# Patient Record
Sex: FEMALE | Race: WHITE | NOT HISPANIC OR LATINO | Employment: FULL TIME | URBAN - METROPOLITAN AREA
[De-identification: names, ages, dates, MRNs, and addresses within clinical notes are randomized per-mention and may not be internally consistent; named-entity substitution may affect disease eponyms.]

---

## 2019-11-08 ENCOUNTER — HOSPITAL ENCOUNTER (OUTPATIENT)
Dept: GENERAL RADIOLOGY | Facility: HOSPITAL | Age: 34
Discharge: HOME | End: 2019-11-08
Attending: OBSTETRICS & GYNECOLOGY

## 2020-08-17 ENCOUNTER — OFFICE VISIT CONVERTED (OUTPATIENT)
Dept: GASTROENTEROLOGY | Facility: CLINIC | Age: 35
End: 2020-08-17
Attending: NURSE PRACTITIONER

## 2020-08-17 ENCOUNTER — CONVERSION ENCOUNTER (OUTPATIENT)
Dept: GASTROENTEROLOGY | Facility: CLINIC | Age: 35
End: 2020-08-17

## 2020-08-19 ENCOUNTER — HOSPITAL ENCOUNTER (OUTPATIENT)
Dept: OTHER | Facility: HOSPITAL | Age: 35
Discharge: HOME | End: 2020-08-19
Attending: NURSE PRACTITIONER

## 2020-08-19 LAB
C DIFF TOX B STL QL CT TISS CULT: NEGATIVE
CONV 027 TOXIN: NEGATIVE

## 2020-08-21 LAB — BACTERIA SPEC AEROBE CULT: NORMAL

## 2020-09-02 ENCOUNTER — HOSPITAL ENCOUNTER (OUTPATIENT)
Dept: OTHER | Facility: HOSPITAL | Age: 35
Discharge: HOME | End: 2020-09-02
Attending: NURSE PRACTITIONER

## 2020-09-04 LAB — CONV CELIAC DISEASE AB-IGA: 66 MG/DL (ref 68–408)

## 2020-09-07 LAB — CELIAC DISEASE DUAL AG SCR: 28 UNITS (ref 0–19)

## 2020-09-08 LAB
CONV GLIADIN PEPTIDE AB IGA: 14 UNITS (ref 0–19)
TTG IGA SER-ACNC: <2 U/ML (ref 0–3)

## 2020-09-09 LAB
GLIADIN PEPTIDE IGG SER-ACNC: 4 UNITS (ref 0–19)
TTG IGG SER-ACNC: 8 U/ML (ref 0–5)

## 2020-10-17 ENCOUNTER — HOSPITAL ENCOUNTER (OUTPATIENT)
Dept: PREADMISSION TESTING | Facility: HOSPITAL | Age: 35
Discharge: HOME | End: 2020-10-17
Attending: INTERNAL MEDICINE

## 2020-10-19 LAB — SARS-COV-2 RNA SPEC QL NAA+PROBE: NOT DETECTED

## 2020-10-22 ENCOUNTER — HOSPITAL ENCOUNTER (OUTPATIENT)
Dept: GASTROENTEROLOGY | Facility: HOSPITAL | Age: 35
Setting detail: HOSPITAL OUTPATIENT SURGERY
Discharge: HOME | End: 2020-10-22
Attending: INTERNAL MEDICINE

## 2020-10-22 LAB — HCG UR QL: NEGATIVE

## 2021-05-10 NOTE — H&P
History and Physical      Patient Name: Najma Mike   Patient ID: [id number]   Sex: Female   YOB: 1985    Primary Care Provider: Justa CHUN   Referring Provider: Justa CHUN    Visit Date: August 17, 2020    Provider: HAI Cantrell   Location: [facility]   Location Address: [address] [place] [address], [address]  [place], [place]  [id number]   Location Phone: [phone]          Chief Complaint  · Diarrhea      History Of Present Illness  The patient is a 35 year old /White female who presents on referral from Justa CHUN for a gastroenterology evaluation.      She presents for evaluation of diarrhea.      Reports that symptoms have been present for 3 weeks.  Reports having 3-5 bowel movements per day.  Describes stool to be a #7 on the [facility] stool chart.      When diarrhea  started, she experienced abdominal cramping that is now much improved.      She is 3 months postpartum and currently breastfeeding.  Denies recent antibiotic use and international travel.      She reports that diarrhea is improved with Imodium. Admits taking probiotics for the past one year.            Past Medical History  ***No Significant Medical History         Past Surgical History  Appendectomy; Jaw Surgery         Medication List  Zoloft oral         Allergy List  NO KNOWN DRUG ALLERGIES       Allergies Reconciled  Family Medical History  *No Known Family History         Social History  Alcohol (Light); Tobacco (Never)         Review of Systems  · Constitutional  o Denies  o : chills, fever  · Eyes  o Denies  o : blurred vision, changes in vision  · Cardiovascular  o Denies  o : chest pain, irregular heart beats  · Respiratory  o Denies  o : shortness of breath, cough  · Gastrointestinal  o Admits  o : See HPI  · Genitourinary  o Denies  o : dysuria, blood in urine  · Integument  o Denies  o : rash, new skin lesions  · Neurologic  o Denies  o : tingling or  "numbness, seizures  · Musculoskeletal  o Denies  o : joint pain, joint swelling  · Endocrine  o Denies  o : weight gain, weight loss  · Psychiatric  o Denies  o : anxiety, depression      Vitals  Date Time BP Position Site L\R Cuff Size HR RR TEMP (F) WT  HT  BMI kg/m2 BSA m2 O2 Sat        08/17/2020 03:31 /76 Sitting      98.2 173lbs 0oz 5'  5\" 28.79 1.9           Physical Examination  · Constitutional  o Appearance  o : well developed, well-nourished, in no acute distress  · Eyes  o Vision  o :   § Visual Fields  § : eyes move symmetrical in all directions  o Sclerae  o : anicteric  o Pupils and Irises  o : pupils equal and symmetrical  · Neck  o Inspection/Palpation  o : supple  · Respiratory  o Respiratory Effort  o : breathing unlabored  o Inspection of Chest  o : normal appearance, no retractions  o Auscultation of Lungs  o : clear to auscultation bilaterally  · Cardiovascular  o Heart  o :   § Auscultation of Heart  § : no murmurs, gallops or rubs  · Gastrointestinal  o Abdominal Examination  o : soft, nontender to palpation, with normal active bowel sounds, no appreciable hepatosplenomegaly  o Digital Rectal Exam  o : deferred  · Lymphatic  o Neck  o : no palpable lymphadenopathy  · Skin and Subcutaneous Tissue  o General Inspection  o : without focal lesions; turgor is normal  · Psychiatric  o General  o : Alert and oriented x3  o Mood and Affect  o : Mood and affect are appropriate to circumstances  · Extremities  o Extremities  o : No edema, no cyanosis          Assessment  · Diarrhea     787.91/R19.7      Plan  · Orders  o C difficile Toxigenic Assay (PCR) [facility] ([id number]) - - 08/17/2020  o Stool culture and sensitivity (39897) - - 08/17/2020  o Fecal Occult Blood Diagnostic (Send to Lab) [facility] (74334) - - 08/17/2020  o Giardia and Cryptosporidium Enzyme Immunoassay [facility] (02575, 61203) - - 08/17/2020  o Lactoferrin (Fecal) Qualitative (05172) - - 08/17/2020  · Medications  o Medications have been " Reconciled  · Instructions  o Information given on current diagnoses. If stool studies negative will need colonoscopy.  · Disposition  o Follow up 2 months            Electronically Signed by: HAI Cantrell -Author on August 17, 2020 04:46:45 PM

## 2021-05-15 VITALS
SYSTOLIC BLOOD PRESSURE: 114 MMHG | TEMPERATURE: 98.2 F | DIASTOLIC BLOOD PRESSURE: 76 MMHG | BODY MASS INDEX: 28.82 KG/M2 | HEIGHT: 65 IN | WEIGHT: 173 LBS

## 2022-03-25 NOTE — PROGRESS NOTES
"Well Woman Visit    CC: Scheduled annual well gyn visit  Chief Complaint   Patient presents with   • Gynecologic Exam       Myriad intake in the past?: Yes    DATE PERFORMED:  Previously Qualified? NO Changes in Family Hx since? YES grandmother dx with breast cancer    Contraception: Condoms    HPI:   36 y.o. desires BTL    Menses:   q mo, lasts 5 days, not heavy.    Pain:  None    PCP: does not have PCP  History: PMHx, Meds, Allergies, PSHx, Social Hx, and POBHx all reviewed and updated.    Pt has no complaints today.    PHYSICAL EXAM:  /82   Pulse 77   Ht 165.1 cm (65\")   Wt 83.5 kg (184 lb)   LMP 2022   BMI 30.62 kg/m²  Not found.  General- NAD, alert and oriented, appropriate  Psych- Normal mood, good memory  Neck- No masses, no thyroid enlargement  CV- Regular rhythm, no murnurs  Resp- CTA to bases, no wheezes  Abdomen- Soft, non distended, non tender, no masses    Breast left-  Bilaterally symmetrical, no masses, non tender, no nipple discharge  Breast right- Bilaterally symmetrical, no masses, non tender, no nipple discharge    External genitalia- Normal female, no lesions  Urethra/meatus- Normal, no masses, non tender  Bladder- Normal, no masses, non tender  Vagina- Normal, no atrophy, no lesions, no discharge.    Cvx- Normal, no lesions, no discharge, No cervical motion tenderness  Uterus- Normal size, shape & consistency.  Non tender, mobile, & no prolapse  Adnexa- No mass, non tender  Anus/Rectum/Perineum- Not performed    Lymphatic- No palpable neck, axillary, or groin nodes  Ext- No edema, no cyanosis    Skin- No lesions, no rashes, no acanthosis nigricans      ASSESSMENT and PLAN:    Diagnoses and all orders for this visit:    1. Well woman exam with routine gynecological exam (Primary)  -     IgP, Aptima HPV    2. Birth control counseling      Discussed types of BTL.  Placed orders for bilat salpingectomy, possible filshie, preop week of OR. Abstinence 2weeks before " OR.      Preventative:  • BREAST HEALTH- Monthly self breast exam importance and how to reviewed. MMG and/or MRI (prn) reviewed per society guidelines and her individual history. Screen: Not medically needed  • CERVICAL CANCER Screening- Reviewed current ASCCP guidelines for screening w and wo cotest HPV, age specific.  Screen: Updated today  • COLON CANCER Screening- Reviewed current medical society guidelines and options.  Screen:  Not medically needed  • Importance of WEIGHT MANAGEMENT, nutrition, and exercise reviewed  • BONE HEALTH- Reviewed current medical society guidelines and options for testing, calcium and vit D intake.  Weight bearing exercise.  DEXA: Not medically needed  • VACCINATIONS Recommended: COVID and booster PRN, Flu annually, Tdap [id number].  Importance discussed, risk being unvaccinated reviewed.  Questions answered  • Smoking status- NON SMOKER.     MYRIAD: Does not qualify.    She understands the importance of having any ordered tests to be performed in a timely fashion.  The risks of not performing them include, but are not limited to, advanced cancer stages, bone loss from osteoporosis and/or subsequent increase in morbidity and/or mortality.  She is encouraged to review her results online and/or contact or office if she has questions.     Follow Up:  Return for Preop OV.            Asuncion Murphy,   03/28/2022    [facility]  [facility] [place] [address]  Dept: [phone]  Dept Fax: [phone]  Loc: [phone]  Loc Fax: [phone]

## 2022-03-28 ENCOUNTER — PREP FOR SURGERY (OUTPATIENT)
Dept: OTHER | Facility: HOSPITAL | Age: 37
End: 2022-03-28

## 2022-03-28 ENCOUNTER — OFFICE VISIT (OUTPATIENT)
Dept: OBSTETRICS AND GYNECOLOGY | Facility: CLINIC | Age: 37
End: 2022-03-28

## 2022-03-28 VITALS
HEART RATE: 77 BPM | WEIGHT: 184 LBS | SYSTOLIC BLOOD PRESSURE: 143 MMHG | HEIGHT: 65 IN | BODY MASS INDEX: 30.66 KG/M2 | DIASTOLIC BLOOD PRESSURE: 82 MMHG

## 2022-03-28 DIAGNOSIS — Z01.419 WELL WOMAN EXAM WITH ROUTINE GYNECOLOGICAL EXAM: Primary | ICD-10-CM

## 2022-03-28 DIAGNOSIS — Z30.09 UNWANTED FERTILITY: Primary | ICD-10-CM

## 2022-03-28 DIAGNOSIS — Z30.09 BIRTH CONTROL COUNSELING: ICD-10-CM

## 2022-03-28 DIAGNOSIS — Z30.2 ENCOUNTER FOR STERILIZATION: Primary | ICD-10-CM

## 2022-03-28 PROCEDURE — 99395 PREV VISIT EST AGE 18-39: CPT | Performed by: OBSTETRICS & GYNECOLOGY

## 2022-03-28 PROCEDURE — G0123 SCREEN CERV/VAG THIN LAYER: HCPCS | Performed by: OBSTETRICS & GYNECOLOGY

## 2022-03-28 PROCEDURE — 87624 HPV HI-RISK TYP POOLED RSLT: CPT | Performed by: OBSTETRICS & GYNECOLOGY

## 2022-03-28 RX ORDER — CEFAZOLIN SODIUM 2 G/100ML
2 INJECTION, SOLUTION INTRAVENOUS ONCE
Status: CANCELLED | OUTPATIENT
Start: 2022-03-28 | End: 2022-03-28

## 2022-03-28 RX ORDER — SODIUM CHLORIDE 0.9 % (FLUSH) 0.9 %
10 SYRINGE (ML) INJECTION AS NEEDED
Status: CANCELLED | OUTPATIENT
Start: 2022-03-28

## 2022-03-28 RX ORDER — ONDANSETRON 2 MG/ML
4 INJECTION INTRAMUSCULAR; INTRAVENOUS EVERY 6 HOURS PRN
Status: CANCELLED | OUTPATIENT
Start: 2022-03-28

## 2022-03-28 RX ORDER — SODIUM CHLORIDE, SODIUM LACTATE, POTASSIUM CHLORIDE, CALCIUM CHLORIDE 600; 310; 30; 20 MG/100ML; MG/100ML; MG/100ML; MG/100ML
150 INJECTION, SOLUTION INTRAVENOUS CONTINUOUS
Status: CANCELLED | OUTPATIENT
Start: 2022-03-28

## 2022-03-28 RX ORDER — SODIUM CHLORIDE 0.9 % (FLUSH) 0.9 %
3 SYRINGE (ML) INJECTION EVERY 12 HOURS SCHEDULED
Status: CANCELLED | OUTPATIENT
Start: 2022-03-28

## 2022-04-01 LAB
CYTOLOGIST CVX/VAG CYTO: NORMAL
CYTOLOGY CVX/VAG DOC CYTO: NORMAL
CYTOLOGY CVX/VAG DOC THIN PREP: NORMAL
DX ICD CODE: NORMAL
HIV 1 & 2 AB SER-IMP: NORMAL
HPV I/H RISK 4 DNA CVX QL PROBE+SIG AMP: NEGATIVE
OTHER STN SPEC: NORMAL
STAT OF ADQ CVX/VAG CYTO-IMP: NORMAL

## 2022-04-21 RX ORDER — DIPHENOXYLATE HYDROCHLORIDE AND ATROPINE SULFATE 2.5; .025 MG/1; MG/1
1 TABLET ORAL
COMMUNITY

## 2022-04-22 ENCOUNTER — LAB (OUTPATIENT)
Dept: LAB | Facility: HOSPITAL | Age: 37
End: 2022-04-22

## 2022-04-22 DIAGNOSIS — Z30.2 ENCOUNTER FOR STERILIZATION: ICD-10-CM

## 2022-04-22 LAB — SARS-COV-2 RNA PNL SPEC NAA+PROBE: NOT DETECTED

## 2022-04-22 PROCEDURE — U0004 COV-19 TEST NON-CDC HGH THRU: HCPCS

## 2022-04-22 PROCEDURE — C9803 HOPD COVID-19 SPEC COLLECT: HCPCS

## 2022-04-25 ENCOUNTER — OFFICE VISIT (OUTPATIENT)
Dept: OBSTETRICS AND GYNECOLOGY | Facility: CLINIC | Age: 37
End: 2022-04-25

## 2022-04-25 VITALS
BODY MASS INDEX: 30.45 KG/M2 | HEART RATE: 81 BPM | OXYGEN SATURATION: 98 % | WEIGHT: 183 LBS | DIASTOLIC BLOOD PRESSURE: 70 MMHG | SYSTOLIC BLOOD PRESSURE: 116 MMHG

## 2022-04-25 DIAGNOSIS — Z30.09 UNWANTED FERTILITY: Primary | ICD-10-CM

## 2022-04-25 DIAGNOSIS — Z01.818 PREOPERATIVE EXAMINATION: ICD-10-CM

## 2022-04-25 PROCEDURE — 99213 OFFICE O/P EST LOW 20 MIN: CPT | Performed by: OBSTETRICS & GYNECOLOGY

## 2022-04-25 PROCEDURE — S0260 H&P FOR SURGERY: HCPCS | Performed by: OBSTETRICS & GYNECOLOGY

## 2022-04-25 NOTE — PROGRESS NOTES
GYN HISTORY & PHYSICAL      Patient Name: Najma Mike  : 1985  MRN: [id number]    Subjective     Chief Complaint: Scheduled Surgery    HPI:  37 y.o.  Contraception:  Condoms    Desires risk reducing salpingectomy.    ROS: All negative except listed in HPI    Personal History     PMHx:    Past Medical History:   Diagnosis Date   • Abnormal Pap smear of cervix     Normal paps since   • Anxiety    • Depression    • History of stomach ulcers     NO CURRENT PROBLEMS   • Migraine WITH aura        OBHx:   OB History    Para Term  AB Living   2 2 2     2   SAB IAB Ectopic Molar Multiple Live Births             2      # Outcome Date GA Lbr Adama/2nd Weight Sex Delivery Anes PTL Lv   2 Term 20 39w1d   M Vag-Spont   THOMAS   1 Term     F Vaginal unsp   THOMAS        Home Medications:  Cetirizine HCl and multivitamin    Allergies:  No Known Allergies    PSHx:   Past Surgical History:   Procedure Laterality Date   • APPENDECTOMY      Open, ruptured   • COLONOSCOPY  10/22/2020   • ENDOSCOPY  10/22/2020   • MANDIBLE SURGERY     • WISDOM TOOTH EXTRACTION         Social History:    reports that she has quit smoking. She has never used smokeless tobacco. She reports current alcohol use. She reports that she does not use drugs.    Family History: Non contributory     Immunizations: Non contributory to this admission        Objective     Vitals:   Heart Rate:  [81] 81  BP: (116)/(70) 116/70    PHYSICAL EXAM:   General- NAD, alert and oriented, appropriate  Psych- Normal mood, good memory  CV- Regular rhythm, no murnurs  Resp- CTA to bases, no wheezes  Abdomen- NABS, soft, non distended, non tender, no masses  Pelvic- Normal external female genitalia  Bladder: Non distended, non tender, no prolapse  Vulva/Vagina: Without lesion and without discharge  Cvx: No cervical motion tenderness  Uterus: Normal size & contour, non tender  Adnexa: No mass, non tender  Lymphatic- No palpable groin  nodes  Rectal- Not examined.     Ext- No edema, bilaterally equal      Lab/Imaging/Other: Pap neg 3/2022      Assessment / Plan     Assessment:  Diagnoses and all orders for this visit:    1. Unwanted fertility (Primary)  Overview:  Added automatically from request for surgery [id number]      2. Preoperative examination      Plan:   • Laparoscopic bilat salpingectomy, possible filshie clips  • Counseling: Risks and benefits and alternatives of surgery were discussed with patient.  Risks are not limited to anesthesia, bleeding, blood transfusion, infection, damage to surrounding organs, wound separation, re-operation, thromboembolic disease, death.  • See separate written and signed consent for surgical specific risks and benefits.    I spent 20 minutes caring for Najma on this date of service. This time includes time spent by me in the following activities:preparing for the visit, reviewing tests, obtaining and/or reviewing a separately obtained history, performing a medically appropriate examination and/or evaluation , counseling and educating the patient/family/caregiver and documenting information in the medical record      Signature: Electronically signed by Asuncion Murphy DO, 04/25/22, 1:58 PM EDT.

## 2022-04-27 ENCOUNTER — ANESTHESIA EVENT (OUTPATIENT)
Dept: PERIOP | Facility: HOSPITAL | Age: 37
End: 2022-04-27

## 2022-04-27 ENCOUNTER — ANESTHESIA (OUTPATIENT)
Dept: PERIOP | Facility: HOSPITAL | Age: 37
End: 2022-04-27

## 2022-04-27 ENCOUNTER — HOSPITAL ENCOUNTER (OUTPATIENT)
Facility: HOSPITAL | Age: 37
Setting detail: HOSPITAL OUTPATIENT SURGERY
Discharge: HOME | End: 2022-04-27
Attending: OBSTETRICS & GYNECOLOGY | Admitting: OBSTETRICS & GYNECOLOGY

## 2022-04-27 VITALS
BODY MASS INDEX: 30.45 KG/M2 | DIASTOLIC BLOOD PRESSURE: 60 MMHG | WEIGHT: 182.76 LBS | HEART RATE: 68 BPM | RESPIRATION RATE: 16 BRPM | SYSTOLIC BLOOD PRESSURE: 112 MMHG | TEMPERATURE: 98 F | HEIGHT: 65 IN | OXYGEN SATURATION: 99 %

## 2022-04-27 DIAGNOSIS — Z30.09 UNWANTED FERTILITY: ICD-10-CM

## 2022-04-27 LAB
ABO GROUP BLD: NORMAL
ABO GROUP BLD: NORMAL
BASOPHILS # BLD AUTO: 0.02 10*3/MM3 (ref 0–0.2)
BASOPHILS NFR BLD AUTO: 0.3 % (ref 0–1.5)
BLD GP AB SCN SERPL QL: NEGATIVE
DEPRECATED RDW RBC AUTO: 40.4 FL (ref 37–54)
EOSINOPHIL # BLD AUTO: 0.07 10*3/MM3 (ref 0–0.4)
EOSINOPHIL NFR BLD AUTO: 1.2 % (ref 0.3–6.2)
ERYTHROCYTE [DISTWIDTH] IN BLOOD BY AUTOMATED COUNT: 12.7 % (ref 12.3–15.4)
HCG INTACT+B SERPL-ACNC: <0.5 MIU/ML
HCT VFR BLD AUTO: 39.4 % (ref 34–46.6)
HGB BLD-MCNC: 13.6 G/DL (ref 12–15.9)
IMM GRANULOCYTES # BLD AUTO: 0.03 10*3/MM3 (ref 0–0.05)
IMM GRANULOCYTES NFR BLD AUTO: 0.5 % (ref 0–0.5)
LYMPHOCYTES # BLD AUTO: 2.04 10*3/MM3 (ref 0.7–3.1)
LYMPHOCYTES NFR BLD AUTO: 33.6 % (ref 19.6–45.3)
MCH RBC QN AUTO: 30.2 PG (ref 26.6–33)
MCHC RBC AUTO-ENTMCNC: 34.5 G/DL (ref 31.5–35.7)
MCV RBC AUTO: 87.4 FL (ref 79–97)
MONOCYTES # BLD AUTO: 0.39 10*3/MM3 (ref 0.1–0.9)
MONOCYTES NFR BLD AUTO: 6.4 % (ref 5–12)
NEUTROPHILS NFR BLD AUTO: 3.52 10*3/MM3 (ref 1.7–7)
NEUTROPHILS NFR BLD AUTO: 58 % (ref 42.7–76)
NRBC BLD AUTO-RTO: 0 /100 WBC (ref 0–0.2)
PLATELET # BLD AUTO: 257 10*3/MM3 (ref 140–450)
PMV BLD AUTO: 9.8 FL (ref 6–12)
RBC # BLD AUTO: 4.51 10*6/MM3 (ref 3.77–5.28)
RH BLD: POSITIVE
RH BLD: POSITIVE
T&S EXPIRATION DATE: NORMAL
WBC NRBC COR # BLD: 6.07 10*3/MM3 (ref 3.4–10.8)

## 2022-04-27 PROCEDURE — 25010000002 DEXAMETHASONE PER 1 MG

## 2022-04-27 PROCEDURE — 86900 BLOOD TYPING SEROLOGIC ABO: CPT | Performed by: OBSTETRICS & GYNECOLOGY

## 2022-04-27 PROCEDURE — 58661 LAPAROSCOPY REMOVE ADNEXA: CPT | Performed by: OBSTETRICS & GYNECOLOGY

## 2022-04-27 PROCEDURE — 25010000002 FENTANYL CITRATE (PF) 50 MCG/ML SOLUTION

## 2022-04-27 PROCEDURE — 86901 BLOOD TYPING SEROLOGIC RH(D): CPT | Performed by: OBSTETRICS & GYNECOLOGY

## 2022-04-27 PROCEDURE — 85025 COMPLETE CBC W/AUTO DIFF WBC: CPT | Performed by: OBSTETRICS & GYNECOLOGY

## 2022-04-27 PROCEDURE — 0 HYDROMORPHONE 1 MG/ML SOLUTION

## 2022-04-27 PROCEDURE — 25010000002 HYDROMORPHONE 1 MG/ML SOLUTION

## 2022-04-27 PROCEDURE — 86901 BLOOD TYPING SEROLOGIC RH(D): CPT

## 2022-04-27 PROCEDURE — 25010000002 MIDAZOLAM PER 1 MG: Performed by: ANESTHESIOLOGY

## 2022-04-27 PROCEDURE — 86900 BLOOD TYPING SEROLOGIC ABO: CPT

## 2022-04-27 PROCEDURE — 88305 TISSUE EXAM BY PATHOLOGIST: CPT | Performed by: OBSTETRICS & GYNECOLOGY

## 2022-04-27 PROCEDURE — 25010000002 KETOROLAC TROMETHAMINE PER 15 MG

## 2022-04-27 PROCEDURE — 25010000002 PROPOFOL 10 MG/ML EMULSION

## 2022-04-27 PROCEDURE — 84702 CHORIONIC GONADOTROPIN TEST: CPT | Performed by: OBSTETRICS & GYNECOLOGY

## 2022-04-27 PROCEDURE — 25010000002 CEFAZOLIN IN DEXTROSE 2-4 GM/100ML-% SOLUTION: Performed by: OBSTETRICS & GYNECOLOGY

## 2022-04-27 PROCEDURE — 86850 RBC ANTIBODY SCREEN: CPT | Performed by: OBSTETRICS & GYNECOLOGY

## 2022-04-27 PROCEDURE — 25010000002 ONDANSETRON PER 1 MG

## 2022-04-27 RX ORDER — OXYCODONE HYDROCHLORIDE 5 MG/1
5-10 TABLET ORAL EVERY 4 HOURS PRN
Qty: 12 TABLET | Refills: 0 | Status: SHIPPED | OUTPATIENT
Start: 2022-04-27

## 2022-04-27 RX ORDER — SODIUM CHLORIDE 0.9 % (FLUSH) 0.9 %
10 SYRINGE (ML) INJECTION AS NEEDED
Status: DISCONTINUED | OUTPATIENT
Start: 2022-04-27 | End: 2022-04-27 | Stop reason: HOSPADM

## 2022-04-27 RX ORDER — ONDANSETRON 2 MG/ML
4 INJECTION INTRAMUSCULAR; INTRAVENOUS ONCE AS NEEDED
Status: DISCONTINUED | OUTPATIENT
Start: 2022-04-27 | End: 2022-04-27 | Stop reason: HOSPADM

## 2022-04-27 RX ORDER — ACETAMINOPHEN 500 MG
1000 TABLET ORAL ONCE
Status: COMPLETED | OUTPATIENT
Start: 2022-04-27 | End: 2022-04-27

## 2022-04-27 RX ORDER — DEXAMETHASONE SODIUM PHOSPHATE 4 MG/ML
INJECTION, SOLUTION INTRA-ARTICULAR; INTRALESIONAL; INTRAMUSCULAR; INTRAVENOUS; SOFT TISSUE AS NEEDED
Status: DISCONTINUED | OUTPATIENT
Start: 2022-04-27 | End: 2022-04-27 | Stop reason: SURG

## 2022-04-27 RX ORDER — PROPOFOL 10 MG/ML
VIAL (ML) INTRAVENOUS AS NEEDED
Status: DISCONTINUED | OUTPATIENT
Start: 2022-04-27 | End: 2022-04-27 | Stop reason: SURG

## 2022-04-27 RX ORDER — IBUPROFEN 600 MG/1
600 TABLET ORAL EVERY 6 HOURS PRN
Qty: 30 TABLET | Refills: 1 | Status: SHIPPED | OUTPATIENT
Start: 2022-04-27

## 2022-04-27 RX ORDER — FENTANYL CITRATE 50 UG/ML
INJECTION, SOLUTION INTRAMUSCULAR; INTRAVENOUS AS NEEDED
Status: DISCONTINUED | OUTPATIENT
Start: 2022-04-27 | End: 2022-04-27 | Stop reason: SURG

## 2022-04-27 RX ORDER — PROMETHAZINE HYDROCHLORIDE 12.5 MG/1
12.5 TABLET ORAL ONCE AS NEEDED
Status: DISCONTINUED | OUTPATIENT
Start: 2022-04-27 | End: 2022-04-27 | Stop reason: HOSPADM

## 2022-04-27 RX ORDER — IBUPROFEN 600 MG/1
600 TABLET ORAL EVERY 6 HOURS PRN
Status: DISCONTINUED | OUTPATIENT
Start: 2022-04-27 | End: 2022-04-27 | Stop reason: HOSPADM

## 2022-04-27 RX ORDER — SODIUM CHLORIDE, SODIUM LACTATE, POTASSIUM CHLORIDE, CALCIUM CHLORIDE 600; 310; 30; 20 MG/100ML; MG/100ML; MG/100ML; MG/100ML
9 INJECTION, SOLUTION INTRAVENOUS CONTINUOUS PRN
Status: DISCONTINUED | OUTPATIENT
Start: 2022-04-27 | End: 2022-04-27 | Stop reason: HOSPADM

## 2022-04-27 RX ORDER — ONDANSETRON 2 MG/ML
4 INJECTION INTRAMUSCULAR; INTRAVENOUS EVERY 6 HOURS PRN
Status: DISCONTINUED | OUTPATIENT
Start: 2022-04-27 | End: 2022-04-27 | Stop reason: HOSPADM

## 2022-04-27 RX ORDER — LIDOCAINE HYDROCHLORIDE 20 MG/ML
INJECTION, SOLUTION EPIDURAL; INFILTRATION; INTRACAUDAL; PERINEURAL AS NEEDED
Status: DISCONTINUED | OUTPATIENT
Start: 2022-04-27 | End: 2022-04-27 | Stop reason: SURG

## 2022-04-27 RX ORDER — PROMETHAZINE HYDROCHLORIDE 25 MG/1
25 SUPPOSITORY RECTAL ONCE AS NEEDED
Status: DISCONTINUED | OUTPATIENT
Start: 2022-04-27 | End: 2022-04-27 | Stop reason: HOSPADM

## 2022-04-27 RX ORDER — DEXMEDETOMIDINE HYDROCHLORIDE 100 UG/ML
INJECTION, SOLUTION INTRAVENOUS AS NEEDED
Status: DISCONTINUED | OUTPATIENT
Start: 2022-04-27 | End: 2022-04-27 | Stop reason: SURG

## 2022-04-27 RX ORDER — ACETAMINOPHEN 325 MG/1
650 TABLET ORAL EVERY 4 HOURS PRN
Qty: 30 TABLET | Refills: 1 | Status: SHIPPED | OUTPATIENT
Start: 2022-04-27

## 2022-04-27 RX ORDER — PHENYLEPHRINE HCL IN 0.9% NACL 1 MG/10 ML
SYRINGE (ML) INTRAVENOUS AS NEEDED
Status: DISCONTINUED | OUTPATIENT
Start: 2022-04-27 | End: 2022-04-27 | Stop reason: SURG

## 2022-04-27 RX ORDER — BUPIVACAINE HYDROCHLORIDE 2.5 MG/ML
INJECTION, SOLUTION EPIDURAL; INFILTRATION; INTRACAUDAL AS NEEDED
Status: DISCONTINUED | OUTPATIENT
Start: 2022-04-27 | End: 2022-04-27 | Stop reason: HOSPADM

## 2022-04-27 RX ORDER — MEPERIDINE HYDROCHLORIDE 25 MG/ML
12.5 INJECTION INTRAMUSCULAR; INTRAVENOUS; SUBCUTANEOUS
Status: DISCONTINUED | OUTPATIENT
Start: 2022-04-27 | End: 2022-04-27 | Stop reason: HOSPADM

## 2022-04-27 RX ORDER — ROCURONIUM BROMIDE 10 MG/ML
INJECTION, SOLUTION INTRAVENOUS AS NEEDED
Status: DISCONTINUED | OUTPATIENT
Start: 2022-04-27 | End: 2022-04-27 | Stop reason: SURG

## 2022-04-27 RX ORDER — ACETAMINOPHEN 325 MG/1
650 TABLET ORAL ONCE
Status: DISCONTINUED | OUTPATIENT
Start: 2022-04-27 | End: 2022-04-27 | Stop reason: HOSPADM

## 2022-04-27 RX ORDER — GLYCOPYRROLATE 0.2 MG/ML
INJECTION INTRAMUSCULAR; INTRAVENOUS AS NEEDED
Status: DISCONTINUED | OUTPATIENT
Start: 2022-04-27 | End: 2022-04-27 | Stop reason: SURG

## 2022-04-27 RX ORDER — SODIUM CHLORIDE, SODIUM LACTATE, POTASSIUM CHLORIDE, CALCIUM CHLORIDE 600; 310; 30; 20 MG/100ML; MG/100ML; MG/100ML; MG/100ML
150 INJECTION, SOLUTION INTRAVENOUS CONTINUOUS
Status: DISCONTINUED | OUTPATIENT
Start: 2022-04-27 | End: 2022-04-27 | Stop reason: HOSPADM

## 2022-04-27 RX ORDER — SODIUM CHLORIDE 0.9 % (FLUSH) 0.9 %
3 SYRINGE (ML) INJECTION EVERY 12 HOURS SCHEDULED
Status: DISCONTINUED | OUTPATIENT
Start: 2022-04-27 | End: 2022-04-27 | Stop reason: HOSPADM

## 2022-04-27 RX ORDER — PROMETHAZINE HYDROCHLORIDE 12.5 MG/1
25 TABLET ORAL ONCE AS NEEDED
Status: DISCONTINUED | OUTPATIENT
Start: 2022-04-27 | End: 2022-04-27 | Stop reason: HOSPADM

## 2022-04-27 RX ORDER — KETOROLAC TROMETHAMINE 30 MG/ML
INJECTION, SOLUTION INTRAMUSCULAR; INTRAVENOUS AS NEEDED
Status: DISCONTINUED | OUTPATIENT
Start: 2022-04-27 | End: 2022-04-27 | Stop reason: SURG

## 2022-04-27 RX ORDER — GLYCOPYRROLATE 0.2 MG/ML
0.2 INJECTION INTRAMUSCULAR; INTRAVENOUS
Status: COMPLETED | OUTPATIENT
Start: 2022-04-27 | End: 2022-04-27

## 2022-04-27 RX ORDER — OXYCODONE HYDROCHLORIDE 5 MG/1
5 TABLET ORAL
Status: DISCONTINUED | OUTPATIENT
Start: 2022-04-27 | End: 2022-04-27 | Stop reason: HOSPADM

## 2022-04-27 RX ORDER — CEFAZOLIN SODIUM 2 G/100ML
2 INJECTION, SOLUTION INTRAVENOUS ONCE
Status: COMPLETED | OUTPATIENT
Start: 2022-04-27 | End: 2022-04-27

## 2022-04-27 RX ORDER — MIDAZOLAM HYDROCHLORIDE 1 MG/ML
2 INJECTION INTRAMUSCULAR; INTRAVENOUS ONCE
Status: COMPLETED | OUTPATIENT
Start: 2022-04-27 | End: 2022-04-27

## 2022-04-27 RX ORDER — ONDANSETRON 2 MG/ML
INJECTION INTRAMUSCULAR; INTRAVENOUS AS NEEDED
Status: DISCONTINUED | OUTPATIENT
Start: 2022-04-27 | End: 2022-04-27 | Stop reason: SURG

## 2022-04-27 RX ADMIN — Medication 150 MCG: at 13:30

## 2022-04-27 RX ADMIN — HYDROMORPHONE HYDROCHLORIDE 0.5 MG: 1 INJECTION, SOLUTION INTRAMUSCULAR; INTRAVENOUS; SUBCUTANEOUS at 14:11

## 2022-04-27 RX ADMIN — GLYCOPYRROLATE 0.2 MG: 0.2 INJECTION INTRAMUSCULAR; INTRAVENOUS at 12:37

## 2022-04-27 RX ADMIN — SUGAMMADEX 200 MG: 100 INJECTION, SOLUTION INTRAVENOUS at 13:48

## 2022-04-27 RX ADMIN — KETOROLAC TROMETHAMINE 30 MG: 30 INJECTION, SOLUTION INTRAMUSCULAR; INTRAVENOUS at 13:42

## 2022-04-27 RX ADMIN — ROCURONIUM BROMIDE 50 MG: 10 INJECTION INTRAVENOUS at 13:01

## 2022-04-27 RX ADMIN — FENTANYL CITRATE 100 MCG: 50 INJECTION, SOLUTION INTRAMUSCULAR; INTRAVENOUS at 13:01

## 2022-04-27 RX ADMIN — DEXMEDETOMIDINE HYDROCHLORIDE 20 MCG: 100 INJECTION, SOLUTION, CONCENTRATE INTRAVENOUS at 13:17

## 2022-04-27 RX ADMIN — MIDAZOLAM HYDROCHLORIDE 2 MG: 1 INJECTION, SOLUTION INTRAMUSCULAR; INTRAVENOUS at 12:37

## 2022-04-27 RX ADMIN — PROPOFOL 200 MG: 10 INJECTION, EMULSION INTRAVENOUS at 13:01

## 2022-04-27 RX ADMIN — LIDOCAINE HYDROCHLORIDE 50 MG: 20 INJECTION, SOLUTION EPIDURAL; INFILTRATION; INTRACAUDAL; PERINEURAL at 13:01

## 2022-04-27 RX ADMIN — ACETAMINOPHEN 1000 MG: 500 TABLET ORAL at 10:30

## 2022-04-27 RX ADMIN — DEXAMETHASONE SODIUM PHOSPHATE 4 MG: 4 INJECTION, SOLUTION INTRA-ARTICULAR; INTRALESIONAL; INTRAMUSCULAR; INTRAVENOUS; SOFT TISSUE at 13:01

## 2022-04-27 RX ADMIN — SODIUM CHLORIDE, POTASSIUM CHLORIDE, SODIUM LACTATE AND CALCIUM CHLORIDE 9 ML/HR: 600; 310; 30; 20 INJECTION, SOLUTION INTRAVENOUS at 10:32

## 2022-04-27 RX ADMIN — HYDROMORPHONE HYDROCHLORIDE 0.5 MG: 1 INJECTION, SOLUTION INTRAMUSCULAR; INTRAVENOUS; SUBCUTANEOUS at 13:27

## 2022-04-27 RX ADMIN — CEFAZOLIN SODIUM 2 G: 2 INJECTION, SOLUTION INTRAVENOUS at 13:06

## 2022-04-27 RX ADMIN — ONDANSETRON 4 MG: 2 INJECTION INTRAMUSCULAR; INTRAVENOUS at 13:01

## 2022-04-27 RX ADMIN — HYDROMORPHONE HYDROCHLORIDE 0.5 MG: 1 INJECTION, SOLUTION INTRAMUSCULAR; INTRAVENOUS; SUBCUTANEOUS at 13:56

## 2022-04-27 RX ADMIN — GLYCOPYRROLATE 0.2 MG: 0.2 INJECTION INTRAMUSCULAR; INTRAVENOUS at 13:30

## 2022-04-27 NOTE — ANESTHESIA POSTPROCEDURE EVALUATION
Patient: Najma Mike    Procedure Summary     Date: 04/27/22 Room / Location: [facility] OR 01 / [facility] MAIN OR    Anesthesia Start: 1256 Anesthesia Stop: 1400    Procedure: SALPINGECTOMY LAPAROSCOPIC (Bilateral Abdomen) Diagnosis:       Unwanted fertility      (Unwanted fertility [Z30.09])    Surgeons: Asuncion Murphy DO Provider: Austin Viveros MD    Anesthesia Type: general ASA Status: 1          Anesthesia Type: general    Vitals  Vitals Value Taken Time   /64 04/27/22 1419   Temp 36.1 °C (97 °F) 04/27/22 1359   Pulse 73 04/27/22 1423   Resp 20 04/27/22 1419   SpO2 95 % 04/27/22 1422   Vitals shown include unvalidated device data.        Post Anesthesia Care and Evaluation    Patient location during evaluation: bedside  Patient participation: complete - patient participated  Level of consciousness: awake  Pain management: adequate  Airway patency: patent  Anesthetic complications: No anesthetic complications  PONV Status: none  Cardiovascular status: acceptable and stable  Respiratory status: acceptable  Hydration status: acceptable    Comments: An Anesthesiologist personally participated in the most demanding procedures (including induction and emergence if applicable) in the anesthesia plan, monitored the course of anesthesia administration at frequent intervals and remained physically present and available for immediate diagnosis and treatment of emergencies.

## 2022-04-27 NOTE — ANESTHESIA PREPROCEDURE EVALUATION
Anesthesia Evaluation     Patient summary reviewed and Nursing notes reviewed                Airway   Mallampati: I  TM distance: >3 FB  Neck ROM: full  No difficulty expected  Dental      Pulmonary - negative pulmonary ROS and normal exam    breath sounds clear to auscultation  Cardiovascular - negative cardio ROS and normal exam    Rhythm: regular  Rate: normal        Neuro/Psych  (+) headaches, psychiatric history,    GI/Hepatic/Renal/Endo    (+) obesity,       Musculoskeletal (-) negative ROS    Abdominal    Substance History - negative use     OB/GYN negative ob/gyn ROS         Other                        Anesthesia Plan    ASA 1     general     intravenous induction     Anesthetic plan, all risks, benefits, and alternatives have been provided, discussed and informed consent has been obtained with: patient.        CODE STATUS:

## 2022-04-30 LAB
CYTO UR: NORMAL
LAB AP CASE REPORT: NORMAL
LAB AP CLINICAL INFORMATION: NORMAL
LAB AP DIAGNOSIS COMMENT: NORMAL
PATH REPORT.FINAL DX SPEC: NORMAL
PATH REPORT.GROSS SPEC: NORMAL

## 2023-01-30 ENCOUNTER — E-VISIT (OUTPATIENT)
Dept: FAMILY MEDICINE CLINIC | Facility: TELEHEALTH | Age: 38
End: 2023-01-30
Payer: COMMERCIAL

## 2023-01-30 PROCEDURE — BRIGHTMDVISIT: Performed by: NURSE PRACTITIONER

## 2023-01-30 NOTE — E-VISIT TREATED
Chief Complaint: Anxiety, Depression, Stress   Patient introduction   Patient is 37-year-old female presenting with mood symptoms. Patient has had current symptoms for less than a year. Patient is willing to try medication as part of their treatment plan.   Patient-submitted comments explaining reason for visit: I've been tracking my mental health symptoms for 4-5 months and have found that they are really only present 1-1.5 weeks before my menstruation begins. I feel easily agitated, extremely angry, hopeless, depressed, suicidal thoughts (never any actions), anxious, self critical, little to no energy or desire to participate in anything I normally find enjoyable, and find myself crying at random times. It's having a severe negative effect on my marriage and my family. Most days I feel ok, with the exception of general anxiety symptoms which I feel I manage pretty well. This premenstrual time is extremely difficult and I am ready to get some help. I really dislike being on anti depressants, I do not like how I feel sort of numb and how it completely stops any sexual desire which in turn causes more anxiety and marriage difficulties. I'm interested in possibly something I can take only during those days that I really need it and cannot manage my emotions on my own. .   Patient did not request an excuse note.   Has had recent unusual stress relating to family functioning, work, finances, and current news and events.   Depression screening   PHQ-9. Response options are: Not at all (0), On several days (1), More than half the days (2), or Nearly every day (3).   Over the past 2 weeks, patient has been bothered:    (1) On several days by having little interest or pleasure in doing things    (1) On several days by depressed mood    (3) Nearly every day by sleep disturbance    (3) Nearly every day by fatigue or lethargy    (3) Nearly every day by change in appetite    (3) Nearly every day by feelings of worthlessness  or excessive guilt    (1) On several days by poor concentration    (0) Not at all by observable restlessness or slowness in movement    (1) On several days by thoughts of hurting themselves or that they would be better off dead   The above problems have made it very difficult to work, function at home, or get along with other people.   Score: 16. Interpretation: 0 to 4: None to minimal. 5 to 9: Mild depression. 10 to 14: Major Depressive Disorder, Mild. 15 to 19: Major Depressive Disorder, Moderately Severe. 20 to 27: Major Depressive Disorder, Severe.   Anxiety screening   LISA-7. Response options are: Not at all (0), On several days (1), More than half the days (2), or Nearly every day (3)   Over the past 2 weeks, patient has been bothered:    (2) On more than half the days by feeling nervous, anxious, or on edge    (1) On several days by not being able to stop or control worrying    (1) On several days by worrying too much about different things    (2) On more than half the days by having trouble relaxing    (1) On several days by being so restless that it is hard to sit still    (3) Nearly every day by becoming easily annoyed or irritable    (0) Not at all by feeling afraid, as if something awful might happen   The above problems have made it very difficult to work, function at home, or get along with other people.   Score: 10. Interpretation: 0 to 4: None to minimal. 5 to 9: Mild anxiety. 10 to 14: Moderate anxiety. 15 to 21: Severe anxiety.   Suicide risk screening   Score: Negative screen (based on PHQ-9 responses above).   Action taken based on risk:    Negative screen: Patient completed interview.    Low risk: Patient completed interview. Follow-up per provider discretion.    Moderate risk: Recommended to call [phone] or [phone] or to go to their nearest ER. Patient given option to continue with the interview if those options are not relevant at this time. Follow-up per provider discretion.    High risk: Interview  terminated. Recommended to go to ER or to call [phone] or [phone].   Repetitive thoughts and behaviors screening   DSM-5 Level 1 Cross-Cutting Symptom Measure, Section X. 2 items. Response options are: Not at all (0), Rarely (1), Several days (2), More than half the days (3), or Nearly every day (4)   Over the past 2 weeks, patient has been bothered:    (1) On 1 to 2 days by unpleasant thoughts, urges, or images that repeatedly enter their mind    (0) Not at all by feeling driven to repeat certain behaviors or mental acts   Score: 1. Interpretation: 0 to 2 (with 0 to 1 on both items): Negative screen. 2 or higher (with 2 or higher on either item): Positive screen.   Shannan/hypomania screening   DSM-5 Level 1 Cross-Cutting Symptom Measure, Section III. 2 items. Response options are: Not at all (0), Rarely (1), Several days (2), More than half the days (3), or Nearly every day (4)   Over the past 2 weeks, patient has been bothered:    (0) Not at all by sleeping less than usual, but still having a lot of energy    (0) Not at all by starting lots more projects than usual or doing more risky things than usual   Score: 0. Interpretation: 0 to 2 (with 1 on both items): Negative screen. 2 or higher (with 2 or higher on at least 1 item): Positive screen; in-interview follow-up with Pop Self-Rating Shannan (ASRM) Scale.   Psychosis/hallucination screening   DSM-5 Level 1 Cross-Cutting Symptom Measure, Section VII. 2 items. Response options are: Not at all (0), Rarely (1), Several days (2), More than half the days (3), or Nearly every day (4)   Over the past 2 weeks, patient has been bothered:    (0) Not at all by hearing things other people could not hear    (0) Not at all by feeling that someone could hear their thoughts   Score: 0. Interpretation: 0: Negative screen. 1 or higher: Positive screen.   Substance abuse screening   DSM-5 Level 1 Cross-Cutting Symptom Measure, Section XIII. 2 items on use of tobacco, recreational drugs,  or prescription medications beyond the amount prescribed or duration of prescription.   Over the past 2 weeks, patient:    (0) Did not use tobacco    (0) Did not use a recreational or prescription drug on their own   Score: 0. Interpretation: 0 is a negative screen. 1 or higher with positive response for prescription/recreational drug abuse leads to follow-up with Level 2 Cross-Cutting Symptom Measure, Section XIII. 1 or higher with positive response for tobacco use leads to tobacco cessation advice in AVS.   AUDIT-C. 3 items, shown if alcohol use reported above.   During the past year, patient:    (2) Had an alcoholic drink 2 to 4 times per month    (1) Had 3 to 4 alcoholic drinks on a typical day when they were drinking    (1) Had 6 or more drinks on one occasion less than monthly   Score: 4. Interpretation: A score of 3 or higher in women is a positive screen for unhealthy drinking.   Comorbid/Exacerbating conditions   No history of asthma, cancer, chronic pain, congestive heart failure, coronary artery disease, diabetes, epilepsy, hypertension, inflammatory arthritis, kidney disease or history of kindey function problems, lupus, multiple sclerosis, Parkinson disease, thyroid disorder, or viral hepatitis.   Past mental health history   Previous diagnosis of depression, generalized anxiety disorder, and panic attacks. Regarding month and year of first depression diagnosis, patient writes: best estimate, 2010. Since initial depression diagnosis, patient has had a period when symptoms resolved and they did not need medication.   Family history of mental health disorders   Family history of depression, panic attacks, and bipolar disorder.   Current mental health treatment   Patient is not currently taking medication for any mental health condition. Patient is not currently in counseling or therapy. Patient is not currently being seen by a psychiatrist and has not been seen by one in the last 2 years.   Previous  mental health treatment   Has taken buspirone, fluoxetine, bupropion, and sertraline in the past.   As to effectiveness of past treatment:   Patient was not satisfied with bupropion. They felt it helped some, but symptoms were still present and caused bothersome side effects. They had dizziness. Patient does not want to refill bupropion.   Patient was not satisfied with buspirone. They felt it helped some, but symptoms were still present and caused bothersome side effects. No insomnia, nausea, or agitation. Patient does not want to refill buspirone.   Patient was not satisfied with fluoxetine. They felt it helped some, but symptoms were still present and caused bothersome side effects. They had sexual side effects and weight gain. Patient does not want to refill fluoxetine.   Patient was satisfied with sertraline. Patient wants to refill sertraline.   Current medications   Currently taking Cetirizine HCl 10 MG capsule, DEVENDRA'S WORT EXTRACT 150 MG Oral Tablet, ashwagandha, l theanine, Magnesium, and vitamin d3.   Medication allergies   None.   Medication contraindications   None.   Assessment   Major depressive disorder, recurrent, moderate.   This diagnosis is based on review of patient interview responses and other available clinical information.    PHQ-9 depression screening score: 16. Interpretation: 15 to 19: Major Depressive Disorder, Moderately Severe.    LISA-7 generalized anxiety screening score: 10. Interpretation: 10 to 14: Moderate anxiety.   Suicide risk severity screening was negative.   Screening results show low likelihood of alex/hypomania and psychosis.   Based on screening tests, the following areas warrant further evaluation at follow-up:    Alcohol use   Plan   Medications:   No medications prescribed.   Orders:    Referral to behavioral health. Additional note: Referral to [facility] for virtual [facility]. Celestina VALLES   Education:    Condition and causes    Treatment and self-care     Possible medication side effects    When to call provider   ----------   Electronically signed by HAI Donovan on 2023-01-30 at 10:47AM   ----------   Patient Interview Transcript:   Have you ever been diagnosed with any of these mental health conditions? Select all that apply.    Depression    Generalized anxiety disorder (ILSA)    Panic attacks   Not selected:    Post traumatic stress disorder (PTSD)    Obsessive-compulsive disorder (OCD)    Bipolar disorder    Schizophrenia or schizoaffective disorder    A mental health condition not listed here (specify)    None of the above   When were you first diagnosed with depression? Please specify the month and year, or your best estimate, as MM/YYYY.    best estimate, 2010   Since you were first diagnosed with depression, has there been a time when your symptoms went away completely and you didn't need to take medication? Select one.    Yes   Not selected:    No   Are you currently taking medication for any mental health condition? Select one.    No   Not selected:    Yes   Have you taken medication for any mental health condition in the past? Select one.    Yes   Not selected:    No   Which medications have you taken in the past for your mental health condition(s)? Select all that apply.    BuSpar (buspirone)    Prozac (fluoxetine)    Wellbutrin SR, Wellbutrin XL, Forfivo XL, or Aplenzin (bupropion)    Zoloft (sertraline)   Not selected:    Atarax or Vistaril (hydroxyzine)    Celexa (citalopram)    Cymbalta or Drizalma Sprinkle (duloxetine)    Effexor or Effexor XR (venlafaxine)    Lexapro (escitalopram)    Paxil, Paxil CR, or Pexeva (paroxetine)    Pristiq (desvenlafaxine)    Remeron (mirtazapine)    Trazodone    Other (specify medication and whether you were satisfied with it)   I'm satisfied with Prozac (fluoxetine)    No   Not selected:    Yes   I want to refill/restart    No   Not selected:    Yes   Why were you unsatisfied with Prozac (fluoxetine)? Select  all that apply.    It helps some, but I still have bothersome symptoms    I don't like the side effects   Not selected:    It doesn't help my symptoms at all    It's too expensive    None of the above   Have you had any of these side effects when taking Prozac (fluoxetine)? Select all that apply.    Sexual side effects    Weight gain   Not selected:    Drowsiness    Trouble sleeping    Headache    Anxiety    Dizziness    None of the above   I'm satisfied with Zoloft (sertraline)    Yes   Not selected:    No   I want to refill/restart    Yes   Not selected:    No   I'm satisfied with BuSpar (buspirone)    No   Not selected:    Yes   I want to refill/restart    No   Not selected:    Yes   Why were you unsatisfied with BuSpar (buspirone)? Select all that apply.    It helps some, but I still have bothersome symptoms    I don't like the side effects   Not selected:    It doesn't help my symptoms at all    It's too expensive    None of the above   Have you had any of these side effects when taking BuSpar (buspirone)? Select all that apply.    None of the above   Not selected:    Trouble sleeping    Nausea    Feeling agitated   I'm satisfied with Wellbutrin SR, Wellbutrin XL, Forfivo XL, or Aplenzin    No   Not selected:    Yes   I want to refill/restart    No   Not selected:    Yes   Why were you unsatisfied with Wellbutrin SR, Wellbutrin XL, Forfivo XL, or Aplenzin (bupropion)? Select all that apply.    It helps some, but I still have bothersome symptoms    I don't like the side effects   Not selected:    It doesn't help my symptoms at all    It's too expensive    None of the above   Have you had any of these side effects when taking Wellbutrin SR, Wellbutrin XL, Forfivo XL, or Aplenzin (bupropion)? Select all that apply.    Dizziness   Not selected:    Dry mouth    Nausea    Trouble sleeping    None of the above   Have you recently experienced unusual stress from any of these? Select all that apply.    Family    Work     Finances    Current news and events   Not selected:    Personal relationships    Home situation    Something related to COVID-19    None of the above   Are you currently in counseling or therapy? Select one.    No   Not selected:    Yes   Are you currently being seen by a psychiatrist, or have you been seen by a psychiatrist in the last 2 years? Select one.    No   Not selected:    Yes, currently    Yes, within the last 2 years   Do any of these apply to you? Select all that apply.    None of the above   Not selected:    I'm pregnant    I've given birth in the past 12 months    I'm breastfeeding   Do you have any of these medical conditions? Scroll to see all options. Select all that apply.    None of the above   Not selected:    Asthma    Cancer    Chronic pain    Congestive heart failure    Coronary artery disease (blocked arteries in the heart)    Diabetes    Epilepsy    High blood pressure    Inflammatory arthritis    Kidney disease or history of kidney function problems    Lupus (SLE)    Multiple sclerosis    Parkinson disease    Thyroid disorder    Viral hepatitis   Has anyone in your family had any of these? Select all that apply.    Depression    Panic attacks    Bipolar disorder   Not selected:    Generalized anxiety disorder (LISA)    Post traumatic stress disorder (PTSD)    Obsessive-compulsive disorder (OCD)    Schizophrenia or schizoaffective disorder    Drug or alcohol addiction (substance use disorder)     by suicide    Attempted suicide    No, not that I know of   1. Over the past 2 weeks, how often have you been bothered by: Having little interest or pleasure in doing things Select one.    Several days   Not selected:    Not at all    More than half the days    Nearly every day   2. Over the past 2 weeks, how often have you been bothered by: Feeling down, depressed, or hopeless Select one.    Several days   Not selected:    Not at all    More than half the days    Nearly every day   3. Over the  past 2 weeks, how often have you been bothered by: Trouble falling or staying asleep, or sleeping too much Select one.    Nearly every day   Not selected:    Not at all    Several days    More than half the days   4. Over the past 2 weeks, how often have you been bothered by: Feeling tired or having little energy Select one.    Nearly every day   Not selected:    Not at all    Several days    More than half the days   5. Over the past 2 weeks, how often have you been bothered by: Poor appetite or overeating Select one.    Nearly every day   Not selected:    Not at all    Several days    More than half the days   6. Over the past 2 weeks, how often have you been bothered by: Feeling bad about yourself, that you're a failure, or that you've let yourself or friends and family down Select one.    Nearly every day   Not selected:    Not at all    Several days    More than half the days   7. Over the past 2 weeks, how often have you been bothered by: Trouble concentrating on things like watching TV or reading the news Select one.    Several days   Not selected:    Not at all    More than half the days    Nearly every day   8. Over the past 2 weeks, how often have you been bothered by: Moving or speaking so slowly that other people could have noticed OR Being so fidgety or restless that you have been moving around a lot more than usual Select one.    Not at all   Not selected:    Several days    More than half the days    Nearly every day   9. Over the past 2 weeks, how often have you been bothered by: Thoughts that you'd be better off dead or thoughts of hurting yourself Select one.    Several days   Not selected:    Not at all    More than half the days    Nearly every day   How difficult have these problems made it for you to work, take care of things at home, or get along with other people? Select one.    Very difficult   Not selected:    Not difficult at all    Somewhat difficult    Extremely difficult   1. Over the  past 2 weeks, how often have you been bothered by: Feeling nervous, anxious, or on edge? Select one.    More than half the days   Not selected:    Not at all    Several days    Nearly every day   2. Over the past 2 weeks, how often have you been bothered by: Not being able to stop or control worrying? Select one.    Several days   Not selected:    Not at all    More than half the days    Nearly every day   3. Over the past 2 weeks, how often have you been bothered by: Worrying too much about different things? Select one.    Several days   Not selected:    Not at all    More than half the days    Nearly every day   4. Over the past 2 weeks, how often have you been bothered by: Having trouble relaxing? Select one.    More than half the days   Not selected:    Not at all    Several days    Nearly every day   5. Over the past 2 weeks, how often have you been bothered by: Being so restless that it's hard to sit still? Select one.    Several days   Not selected:    Not at all    More than half the days    Nearly every day   6. Over the past 2 weeks, how often have you been bothered by: Becoming easily annoyed or irritable? Select one.    Nearly every day   Not selected:    Not at all    Several days    More than half the days   7. Over the past 2 weeks, how often have you been bothered by: Feeling afraid, as if something awful might happen? Select one.    Not at all   Not selected:    Several days    More than half the days    Nearly every day   How difficult have these symptoms made it for you to do your work, take care of things at home, or get along with other people? Select one.    Very difficult   Not selected:    Not difficult at all    Somewhat difficult    Extremely difficult   Over the past 2 weeks, how often have you been bothered by: Sleeping less than usual, but still having a lot of energy? Select one.    Not at all   Not selected:    1 to 2 days    Several days    More than half the days    Nearly every  day   Over the past 2 weeks, how often have you been bothered by: Starting lots more projects than usual or doing more risky things than usual? Select one.    Not at all   Not selected:    1 to 2 days    Several days    More than half the days    Nearly every day   Over the past 2 weeks, how often have you been bothered by: Hearing things other people couldn't hear, such as voices even when no one was around? Select one.    Not at all   Not selected:    1 to 2 days    Several days    More than half the days    Nearly every day   Over the past 2 weeks, how often have you been bothered by: Feeling that someone could hear your thoughts, or that you could hear what another person was thinking? Select one.    Not at all   Not selected:    1 to 2 days    Several days    More than half the days    Nearly every day   Over the past 2 weeks, how often have you been bothered by: Unpleasant thoughts, urges, or images that repeatedly enter your mind? Select one.    1 to 2 days   Not selected:    Not at all    Several days    More than half the days    Nearly every day   Over the past 2 weeks, how often have you been bothered by: Feeling driven to perform certain behaviors or mental acts over and over again? Select one.    Not at all   Not selected:    1 to 2 days    Several days    More than half the days    Nearly every day   In the past year, how often did you have a drink containing alcohol? Select one.    2 to 4 times per month   Not selected:    Never    Monthly or less    2 to 3 times per week    4 or more times per week   In the past year, how many drinks did you have on a typical day when you were drinking? Select one.    3 or 4   Not selected:    1 or 2    5 or 6    7 to 9    10 or more   In the past year, how often did you have 6 or more drinks on one occasion? Select one.    Less than monthly   Not selected:    Never    Monthly    Weekly    Daily or almost daily   Over the past 2 weeks, how often have you: Smoked any  "cigarettes, smoked a cigar or pipe, or used snuff or chewing tobacco? Select one.    Not at all   Not selected:    1 to 2 days    Several days    More than half the days    Nearly every day   Over the past 2 weeks, how often did you use any of these medicines on your own? \"On your own\" means without a doctor's prescription, or more than prescribed, or longer than prescribed. - Prescription painkillers, such as Vicodin - Stimulants, such as Ritalin or Adderall - Sedatives or tranquilizers, such as sleeping pills or Valium - Marijuana - Cocaine or crack - Club drugs, such as Ecstasy - Hallucinogens, such as LSD - Heroin - Inhalants or solvents, such as glue - Methamphetamines, such as speed Select one.    Not at all   Not selected:    1 to 2 days    Several days    More than half the days    Nearly every day   Think about all of the symptoms you've shared with us today. How long have you been feeling this way? Select one.    Less than a year   Not selected:    More than a year    I'm not sure   These last few questions help us make sure your treatment plan is safe for you. Do you have any of these conditions? Select all that apply.    None of these   Not selected:    Uncorrected or persistent electrolyte abnormalities, such as potassium, sodium, calcium or magnesium    QT prolongation    Congenital long QT syndrome (LQTS)    Ventricular arrhythmias, such as ventricular fibrillation or ventricular tachycardia    Bradycardia (low heart rate)    Recent heart attack    Congestive heart failure (CHF)   Do any of these apply to you now or in the recent past? \"Cold turkey\" here means stopping a medication suddenly rather than slowly taking lower and lower doses until you're off the medication. Select all that apply.    None of these   Not selected:    Seizure disorder    Bulimia or anorexia    Liver disease    Alcohol abuse    Stopped using alcohol \"cold turkey\"    Stopped using a sedative \"cold turkey\"    Stopped using an " "anti-seizure drug \"cold turkey\"    Stopped using a benzodiazepine drug (Klonopin, Valium, Ativan, Xanax) \"cold turkey\"   Do any of the following apply to you? Select all that apply.    None of these   Not selected:    I'm currently taking pimozide    I'm currently taking thioridazine    I've taken an MAO inhibitor in the last 14 days    I've taken linezolid or IV methylene blue in the last 14 days   Are you still taking these medications listed in your medical record? If you're not taking any of these, click Next. Select all that apply.    Cetirizine HCl 10 MG capsule   Are you taking any other medications, vitamins, or supplements? Select one.    Yes   Not selected:    No   Have you ever had an allergic or bad reaction to any medication? Select one.    No   Not selected:    Yes   If medication is recommended as part of your treatment plan, is that something you're willing to try? Select one.    Yes   Not selected:    No   Do you need a doctor's note? A doctor's note confirms that you received care today and states when you can return to school or work. It does not contain information about your diagnosis or treatment plan. Your provider will make the final decision on whether to give you a doctor's note. Doctor's notes CANNOT be backdated. Select one.    No   Not selected:    Today only (1 day)    Today and tomorrow (2 days)    3 days   What is the main reason you're taking this interview today?    I've been tracking my mental health symptoms for 4-5 months and have found that they are really only present 1-1.5 weeks before my menstruation begins. I feel easily agitated, extremely angry, hopeless, depressed, suicidal thoughts (never any actions), anxious, self critical, little to no energy or desire to participate in anything I normally find enjoyable, and find myself crying at random times. It's having a severe negative effect on my marriage and my family. Most days I feel ok, with the exception of general anxiety " symptoms which I feel I manage pretty well. This premenstrual time is extremely difficult and I am ready to get some help. I really dislike being on anti depressants, I do not like how I feel sort of numb and how it completely stops any sexual desire which in turn causes more anxiety and marriage difficulties. I'm interested in possibly something I can take only during those days that I really need it and cannot manage my emotions on my own.   ----------   Medical history   The following information was received from the EMR on January 30, 2023.   Allergies:    No Known Allergies   - Allergy Type:   - Reaction:   - Severity:   - Clinical Status: Active   - Verification Status: Confirmed   Medications:    acetaminophen (TYLENOL) tablet   - Route: Oral   - Start Date: April 27, 2022   - End Date: None   - Status: Active    CETIRIZINE HCL 10 MG PO CAPS   - Route: Oral   - Start Date: April 21, 2022   - End Date: None   - Status: Active    multivitamin (THERAGRAN) tablet   - Route: Oral   - Start Date: April 21, 2022   - End Date: None   - Status: Active    ibuprofen (MOTRIN) tablet   - Route: Oral   - Start Date: April 27, 2022   - End Date: None   - Status: Active    oxyCODONE (ROXICODONE) immediate release tablet   - Route: Oral   - Start Date: April 27, 2022   - End Date: None   - Status: Active   Problem list:    Unwanted fertility   - Category: Problem List Item   - Health Status:   - Start Date: March 28, 2022   - End Date: None   - Status: Active

## 2023-01-30 NOTE — EXTERNAL PATIENT INSTRUCTIONS
Note   Najma, I am referring you to Behavioral health for evaluation. They will then discuss your medical management and options for treatment. Since you have been off Sertraline for a while i can not refill this bur they will be able to help you. Best regards, Celestina   Diagnosis   Depression   My name is Celestina Mendez. I'm a healthcare provider at [facility]. I've reviewed your interview, and I see that you have some common symptoms of depression. I'm glad you reached out.   Depression is the most common mental health condition worldwide. In the [place], about 1 in 5 people will experience clinical depression in their lifetime. Fortunately, effective treatments are available. The sooner you start treatment, the better it works.   Treatment for depression can include counseling, coaching, consultations, antidepressant medications, or various digital tools. In creating your treatment plan, I've considered your symptoms, current situation, medical history, and previous treatments, if any.    Please follow up with your provider in a few weeks. They'll check how you're doing and adjust your treatment plan if necessary.   In addition to your prescribed treatment, there are things you can do to help yourself feel better. Depression can lead you to avoid doing tasks, activities, and being with others. Taking action can help break the cycle of avoidance. Even small actions and lifestyle changes can make a big difference. Try some of the suggestions in the Other treatment section below.   Orders and referrals   I've included a referral for therapy in your treatment plan. Someone will contact you to schedule an appointment for counseling or therapy.   About your diagnosis   Depression is different from ordinary sadness. When you're sad or going through normal grief, the feelings may come and go, and then fade over time. Depression causes long-standing symptoms that affect your ability to go about your daily  life.   It's a health condition that affects how you think and function, and can even affect your self-esteem. Sometimes depression can also cause physical symptoms such as headaches and stomach pains.   Common symptoms of depression include:    Feeling sad, hopeless, discouraged, or down    Loss of interest or pleasure in previously enjoyable activities    Appetite or weight changes    Sleep disturbances: sleeping too much or too little    Either restlessness or sluggishness    Loss of energy    Excessive guilt    Feelings of worthlessness    Difficulty concentrating    Recurrent thoughts of death or suicide   What to expect   Counseling and talk therapy   Counseling or therapy teaches you new coping skills and more adaptive ways of thinking about problems. These tools can help you make positive changes. The benefits of counseling often last long after treatment sessions have stopped.   Many people with mild symptoms of depression don't need medication, and can be treated with counseling, coaching, or other types of care management.   When to seek care   If you feel like harming yourself or others, call 911 right away.   The [facility] Suicide and Crisis Lifeline is also available. You can call 988   to speak with a counselor at the lifeline, or you can connect with one using their online chat  .   Call us at [phone]   with any sudden or unexpected symptoms.    Worsening depression symptoms    New or worsening anxiety symptoms    Feeling extremely agitated or restless    Panic attacks    Worsening insomnia    New or worsening irritability    Inappropriate aggression, anger, or violence    Dangerous impulses    Extreme increase in activity or talking    Other unusual changes in behavior, mood, thoughts, or feeling   Other treatment   The tips below may help you feel better while you start your treatment plan:   Self-care    Depression can make self-care hard, but taking action can help you get better. So start  "where you are and set small goals. These can be simple: get out of bed, take a shower, get dressed, prepare a meal.    Make a list of activities that usually improve your mood. When you're feeling down, try doing one of those activities, even for a few minutes.    Be kind to yourself. Don't get down on yourself if you don't reach a goal. Be willing to try again.    Try to eat on a regular schedule. Blood sugar levels can affect mood.   Avoid alcohol - Alcohol is a depressant and may make you feel worse.   Exercise    Physical exercise has an especially positive effect on mood. If you're able to, try walking 30 minutes a day, 3 to 5 times a week. If that sounds like too much, challenge yourself to start walking for just 10 minutes a day. If walking is not for you, find another activity. Any kind of physical activity helps. The best exercise is the kind you enjoy and will actually do.   Improve your sleep   Getting better sleep is one of the best things you can do to improve your symptoms.    Caffeine, tobacco, and alcohol can cause interrupted sleep. Cutting down or quitting these can improve the quality of your sleep. If you can't quit caffeine completely, try avoiding it later in the day.    Set a regular bedtime, and allow a period of time to \"unwind\" before going to sleep.    Wake up at the same time every day.    Turn off or put away all electronic devices an hour before going to sleep.    Avoid reading, watching TV, or using electronic devices in bed.    As much as possible, keep your bedroom dark, cool, and quiet.    If you're struggling to sleep, don't stay in bed. Get up and go to a quiet spot. Read or do relaxation exercises. Then go back to bed and try again.   Try mindfulness exercises    If your mind races, focus on your body instead. Breathe in slowly through your nose and out through your mouth.    Some people find that meditation helps with mood symptoms. If you want to try meditation but don't know " how, mobile apps can get you started.   Use your creativity    When you have depression, you can often spend too much time thinking. Making something with your hands can use your thoughts in a positive way and bring some relief. It also helps you move from inaction to action. Activities like writing in a journal, gardening, woodworking, cooking, or doing a craft can help focus your mind.   Connect with others    If you can't meet in person, send a short text or email to someone just to keep in touch.    If you use social media, notice how it makes you feel. If certain topics or people have a negative effect on your mood, unfollow them. Limit the time you spend on social media. Active participation can be better than passive scrolling through a feed.    If you're up to it, try volunteering. Or just do something kind for someone. This can lift your mood as well as theirs.   Your provider   Your diagnosis was provided by Celestina Mendez, a member of your trusted care team at [facility].   If you have any questions, call us at [phone]  .

## 2023-05-01 NOTE — PROGRESS NOTES
"Well Woman Visit    CC: Scheduled annual well gyn visit  Chief Complaint   Patient presents with   • Annual Exam       [facility] intake in the past?: yes  DATE PERFORMED:  Previously Qualified? NO.  No new FHx of malignancy.    HPI:   38 y.o.   Social History     Substance and Sexual Activity   Sexual Activity Yes   • Partners: Male   • Birth control/protection: Bilateral salpingectomy      Pap smear 2022 negative and HPV negative  Menses:   q 25 days, lasts 3 days, changes products q 2 hrs on heaviest days.   Pain with menses:  Mild, OTC meds control discomfort    For 8 mo issues w PMS 4-5 days before menses.  Angry, takes it out on family, sad at times.  Occas thought of self harm, no active plans and has a plan for therapist to help.  Lasts only 3-4 days, gone w menses onset.  Tried herbal OTC, not helping. In past used Zoloft 50mg for PPD, very effective.  Caused decreased sex drive.      Weight gain, eating less, not able to exercise.    PCP: does not have PCP  History: PMHx, Meds, Allergies, PSHx, Social Hx, and POBHx all reviewed and updated.    PHYSICAL EXAM:  /79   Pulse 74   Ht 165.1 cm (65\")   Wt 83.5 kg (184 lb)   LMP 2023   Breastfeeding No   BMI 30.62 kg/m²  Not found.  General- NAD, alert and oriented, appropriate  Psych- Normal mood, good memory  Neck- No masses, no thyroid enlargement  CV- Regular rhythm, no murnurs  Resp- CTA to bases, no wheezes  Abdomen- Soft, non distended, non tender, no masses    Breast left-  Bilaterally symmetrical, no masses, non tender, no nipple discharge, no axillary or supraclavicular nodes palpable  Breast right- Bilaterally symmetrical, no masses, non tender, no nipple discharge, no axillary or supraclavicular nodes palpable    External genitalia- Normal female, lesions    Physical Exam  Genitourinary:         Comments: Red -  skin colored, slightly irreg 2 mm  Blue - flat, light to med brown, not raised.  Not seen last " year.          Urethra/meatus- Normal, no masses, non tender  Bladder- Normal, no masses, non tender  Vagina- Normal, no atrophy, no lesions, no discharge.  Prolapse: none noted, not examined with split speculum to delineate  Cvx- Normal, no lesions, no discharge, No cervical motion tenderness  Uterus- Normal size, shape & consistency.  Non tender, mobile, & no prolapse  Adnexa- No mass, non tender  Anus/Rectum/Perineum- Not performed    Lymphatic- No palpable neck, axillary, or groin nodes  Ext- No edema, no cyanosis    Skin- No lesions, no rashes, no acanthosis nigricans      ASSESSMENT and PLAN:    Diagnoses and all orders for this visit:    1. Well woman exam (Primary)  -     Hemoglobin A1c  -     Lipid Panel  -     T4, Free  -     TSH    2. PMDD (premenstrual dysphoric disorder)  -     FLUoxetine HCl, PMDD, 10 MG tablet; Take 10 mg by mouth Daily.  Dispense: 30 each; Refill: 2    3. Weight gain  -     T4, Free  -     TSH    4. Vulvar lesions  Comments:  Plan bx of blue and removal of red        Preventative:  • BREAST HEALTH- Monthly self breast exam importance and how to reviewed. MMG and/or MRI (prn) reviewed per society guidelines and her individual history. Screen: Not medically needed  • CERVICAL CANCER Screening- Reviewed current ASCCP guidelines for screening w and wo cotest HPV, age specific.  Screen: Already up to date  • COLON CANCER Screening- Reviewed current medical society guidelines and options.  Screen:  Already up to date  • Importance of WEIGHT MANAGEMENT, nutrition, and exercise reviewed  • BONE HEALTH- Reviewed current medical society guidelines and options for testing, calcium and vit D intake.  Weight bearing exercise.  DEXA: Not medically needed  • VACCINATIONS Recommended: COVID and booster PRN, Flu annually, Gardisil/HPV vaccine (up to 44yo), Tdap [id number].  Importance discussed, risk being unvaccinated reviewed.  Questions answered  • Smoking status- NON SMOKER  • Follow up  PCP/Specialist PMHx and Labs        She understands the importance of having any ordered tests to be performed in a timely fashion.  The risks of not performing them include, but are not limited to, advanced cancer stages, bone loss from osteoporosis and/or subsequent increase in morbidity and/or mortality.  She is encouraged to review her results online and/or contact or office if she has questions.     Follow Up:  Return in about 1 month (around 6/2/2023) for Vulvar bx and then 3mo later FU meds.            Asuncion Murphy,   05/02/2023    [facility]  [address] [place] [address]  Dept: [phone]  Dept Fax: [phone]  Loc: [phone]  Loc Fax: [phone]

## 2023-05-02 ENCOUNTER — OFFICE VISIT (OUTPATIENT)
Dept: OBSTETRICS AND GYNECOLOGY | Facility: CLINIC | Age: 38
End: 2023-05-02
Payer: COMMERCIAL

## 2023-05-02 VITALS
HEIGHT: 65 IN | BODY MASS INDEX: 30.66 KG/M2 | SYSTOLIC BLOOD PRESSURE: 125 MMHG | WEIGHT: 184 LBS | HEART RATE: 74 BPM | DIASTOLIC BLOOD PRESSURE: 79 MMHG

## 2023-05-02 DIAGNOSIS — N90.89 VULVAR LESION: ICD-10-CM

## 2023-05-02 DIAGNOSIS — R63.5 WEIGHT GAIN: ICD-10-CM

## 2023-05-02 DIAGNOSIS — F32.81 PMDD (PREMENSTRUAL DYSPHORIC DISORDER): ICD-10-CM

## 2023-05-02 DIAGNOSIS — Z01.419 WELL WOMAN EXAM: Primary | ICD-10-CM

## 2023-05-02 LAB
CHOLEST SERPL-MCNC: 220 MG/DL (ref 0–200)
HBA1C MFR BLD: 5.5 % (ref 4.8–5.6)
HDLC SERPL-MCNC: 46 MG/DL (ref 40–60)
LDLC SERPL CALC-MCNC: 145 MG/DL (ref 0–100)
LDLC/HDLC SERPL: 3.09 {RATIO}
T4 FREE SERPL-MCNC: 1.41 NG/DL (ref 0.93–1.7)
TRIGL SERPL-MCNC: 160 MG/DL (ref 0–150)
TSH SERPL DL<=0.05 MIU/L-ACNC: 1.62 UIU/ML (ref 0.27–4.2)
VLDLC SERPL-MCNC: 29 MG/DL (ref 5–40)

## 2023-05-02 PROCEDURE — 83036 HEMOGLOBIN GLYCOSYLATED A1C: CPT | Performed by: OBSTETRICS & GYNECOLOGY

## 2023-05-02 PROCEDURE — 84439 ASSAY OF FREE THYROXINE: CPT | Performed by: OBSTETRICS & GYNECOLOGY

## 2023-05-02 PROCEDURE — 84443 ASSAY THYROID STIM HORMONE: CPT | Performed by: OBSTETRICS & GYNECOLOGY

## 2023-05-02 PROCEDURE — 80061 LIPID PANEL: CPT | Performed by: OBSTETRICS & GYNECOLOGY

## 2023-05-02 RX ORDER — FLUOXETINE 10 MG/1
10 TABLET ORAL DAILY
Qty: 30 EACH | Refills: 2 | Status: SHIPPED | OUTPATIENT
Start: 2023-05-02

## 2023-05-15 ENCOUNTER — TELEPHONE (OUTPATIENT)
Dept: GASTROENTEROLOGY | Facility: CLINIC | Age: 38
End: 2023-05-15
Payer: COMMERCIAL

## 2023-05-15 NOTE — TELEPHONE ENCOUNTER
Patient has an appointment in October. She is also on the cancellation list. She is having severe abd pain everytime she eats. She said she hasn't eaten in 2 days. She would like to see if Julee could see sooner. I also told her if it gets so bad she needs to go to the ER.

## 2023-05-19 ENCOUNTER — TELEPHONE (OUTPATIENT)
Dept: GASTROENTEROLOGY | Facility: CLINIC | Age: 38
End: 2023-05-19
Payer: COMMERCIAL

## 2023-05-19 NOTE — TELEPHONE ENCOUNTER
Patient called this morning wanting to know if Julee could see her sooner than October. She is having severe abd pain /haven't eaten in a week. She is on the cancellation list. Please advise.

## 2023-05-22 ENCOUNTER — OFFICE VISIT (OUTPATIENT)
Dept: GASTROENTEROLOGY | Facility: CLINIC | Age: 38
End: 2023-05-22
Payer: COMMERCIAL

## 2023-05-22 ENCOUNTER — TELEPHONE (OUTPATIENT)
Dept: GASTROENTEROLOGY | Facility: CLINIC | Age: 38
End: 2023-05-22

## 2023-05-22 VITALS
DIASTOLIC BLOOD PRESSURE: 84 MMHG | WEIGHT: 176 LBS | SYSTOLIC BLOOD PRESSURE: 128 MMHG | BODY MASS INDEX: 29.32 KG/M2 | HEART RATE: 71 BPM | HEIGHT: 65 IN

## 2023-05-22 DIAGNOSIS — R10.13 EPIGASTRIC PAIN: Primary | ICD-10-CM

## 2023-05-22 RX ORDER — FLUOXETINE 10 MG/1
1 CAPSULE ORAL DAILY
COMMUNITY
Start: 2023-05-02

## 2023-05-22 NOTE — TELEPHONE ENCOUNTER
Called patient from the cancellation list to see if she wanted to come in today instead of October. She rescheduled her appointment for today.

## 2023-05-22 NOTE — PROGRESS NOTES
Chief Complaint     Abdominal Pain    History of Present Illness     Najma Mike is a 38 y.o. female who presents to [facility] for follow-up of abdominal pain.      She reports epigastric pain that radiated to LUQ and through to back.  This occurred for the first time about 10 days ago.  States that she fasted for about three days and this improved pain some.  Pain lasts for about two hours when present.  It has occurred about 3 times total.  Admits reflux for the past few days.  Denies nausea and vomiting.  Admits two episodes of diarrhea in the past week.  Denies rectal bleeding.       History      Past Medical History:   Diagnosis Date   • Abnormal Pap smear of cervix 2008    Normal paps since   • Anxiety    • Depression    • History of stomach ulcers     NO CURRENT PROBLEMS   • Migraine WITH aura      Past Surgical History:   Procedure Laterality Date   • APPENDECTOMY      Open, ruptured   • COLONOSCOPY  10/22/2020   • ENDOSCOPY  10/22/2020   • MANDIBLE SURGERY  2018   • SALPINGECTOMY Bilateral 04/27/2022    Bilateral.  Surgeon: Asuncion Murphy DO;  Location: [facility] MAIN OR;  Service: Obstetrics/Gynecology;  Laterality: Bilateral;   • WISDOM TOOTH EXTRACTION       Family History   Problem Relation Age of Onset   • Cervical cancer Mother 19   • Breast cancer Maternal Grandmother 75   • Breast cancer Maternal Aunt    • Ovarian cancer Neg Hx    • Uterine cancer Neg Hx    • Colon cancer Neg Hx    • Deep vein thrombosis Neg Hx    • Malig Hyperthermia Neg Hx    • Pulmonary embolism Neg Hx         Current Medications       Current Outpatient Medications:   •  acetaminophen (TYLENOL) 325 MG tablet, Take 2 tablets by mouth Every 4 (Four) Hours As Needed for Mild Pain  or Moderate Pain ., Disp: 30 tablet, Rfl: 1  •  Cetirizine HCl 10 MG capsule, Take 1 tablet by mouth Every Morning., Disp: , Rfl:   •  FLUoxetine (PROzac) 10 MG capsule, Take 1 capsule by mouth Daily., Disp: , Rfl:   •   "ibuprofen (ADVIL,MOTRIN) 600 MG tablet, Take 1 tablet by mouth Every 6 (Six) Hours As Needed for Mild Pain  or Moderate Pain ., Disp: 30 tablet, Rfl: 1     Allergies     No Known Allergies    Social History       Social History     Social History Narrative   • Not on file         Objective       /84 (BP Location: Left arm, Patient Position: Sitting, Cuff Size: Adult)   Pulse 71   Ht 165.1 cm (65\")   Wt 79.8 kg (176 lb)   BMI 29.29 kg/m²       Physical Exam    Results       Result Review :    The following data was reviewed by: HAI Matos on 05/22/2023:                     Assessment and Plan              Diagnoses and all orders for this visit:    1. Epigastric pain (Primary)  -     US Abdomen Limited; Future  -     NM HIDA SCAN WITH PHARMACOLOGICAL INTERVENTION; Future      If gallbladder w/u negative, recommend EGD for further w/u.    Follow Up     Follow Up   Return in about 4 months (around 9/22/2023) for abdominal pain.  Patient was given instructions and counseling regarding her condition or for health maintenance advice. Please see specific information pulled into the AVS if appropriate.     "

## 2023-06-02 NOTE — PROGRESS NOTES
Procedure Note      Chief Complaint   Patient presents with    Vulvar Bx       Consent signed: yes  Local lidocaine jelly placed:  Ye    Procedure was discussed.  She understand the benefits, risks, and alternatives.  The potential risks are not limited to pain, bleeding, and/or infection.  All her questions have been answered to her satisfaction and she desires the procedure.    HPI:   Progress Notes by Asuncion Murphy DO (05/02/2023 09:00)    PHYSICAL EXAM:  /70   Wt 80.7 kg (178 lb)   LMP 05/19/2023 (Approximate)   BMI 29.62 kg/m²     Physical Exam    Biopsy/Lesion excision:  After consent betadine or hibiclens (if betadine allergy) used   Local anesthetic wo epi placed  Specimen/s sent to path. Lesion SIZE: mons 2mm, left labial punch 5mm     Shave excision of entire mons lesion  Punch biopsy 5mm left labial dark lesion  3-0 monocryl for approximation and hemostasis  Patient tolerated well      ASSESSMENT AND PLAN:   Diagnoses and all orders for this visit:    1. Vulvar lesion  -     Tissue Pathology Exam        Counseling:   Post procedure precautions: bleeding, infection, pain.  Care of area reviewed, keep clean and dry.  Avoid intercourse or tub bath/pool until healed completely.  OTC meds prn discomfort.  If biopsy/lesion excision performed:  we will call with results, if pt has not heard from our office in 7-10days, pt agrees to call.  I have also encourage online retrieval of results.    Follow Up:  Return for PRN after BIOPSY resulted.          Asuncion Murphy DO  06/05/2023    [facility]  [address] [place] [address] [address] [place] [address]  Dept: [phone]  Dept Fax: [phone]  Loc: [phone]  Loc Fax: [phone]

## 2023-06-03 PROBLEM — N90.89 VULVAR LESION: Status: ACTIVE | Noted: 2023-06-03

## 2023-06-05 ENCOUNTER — PROCEDURE VISIT (OUTPATIENT)
Dept: OBSTETRICS AND GYNECOLOGY | Facility: CLINIC | Age: 38
End: 2023-06-05
Payer: COMMERCIAL

## 2023-06-05 VITALS — SYSTOLIC BLOOD PRESSURE: 118 MMHG | WEIGHT: 178 LBS | DIASTOLIC BLOOD PRESSURE: 70 MMHG | BODY MASS INDEX: 29.62 KG/M2

## 2023-06-05 DIAGNOSIS — N90.89 VULVAR LESION: ICD-10-CM

## 2023-06-05 PROCEDURE — 56605 BIOPSY OF VULVA/PERINEUM: CPT | Performed by: OBSTETRICS & GYNECOLOGY

## 2023-06-05 PROCEDURE — 88304 TISSUE EXAM BY PATHOLOGIST: CPT | Performed by: OBSTETRICS & GYNECOLOGY

## 2023-06-05 PROCEDURE — 11420 EXC H-F-NK-SP B9+MARG 0.5/<: CPT | Performed by: OBSTETRICS & GYNECOLOGY

## 2023-06-05 PROCEDURE — 88305 TISSUE EXAM BY PATHOLOGIST: CPT | Performed by: OBSTETRICS & GYNECOLOGY

## 2023-06-08 LAB
CYTO UR: NORMAL
LAB AP CASE REPORT: NORMAL
LAB AP CLINICAL INFORMATION: NORMAL
PATH REPORT.FINAL DX SPEC: NORMAL
PATH REPORT.GROSS SPEC: NORMAL

## 2023-06-19 ENCOUNTER — TELEPHONE (OUTPATIENT)
Dept: GASTROENTEROLOGY | Facility: CLINIC | Age: 38
End: 2023-06-19
Payer: COMMERCIAL

## 2023-08-03 PROBLEM — F32.81 PMDD (PREMENSTRUAL DYSPHORIC DISORDER): Status: ACTIVE | Noted: 2023-08-03

## 2023-08-04 ENCOUNTER — OFFICE VISIT (OUTPATIENT)
Dept: OBSTETRICS AND GYNECOLOGY | Facility: CLINIC | Age: 38
End: 2023-08-04
Payer: COMMERCIAL

## 2023-08-04 ENCOUNTER — TELEPHONE (OUTPATIENT)
Dept: GASTROENTEROLOGY | Facility: CLINIC | Age: 38
End: 2023-08-04
Payer: COMMERCIAL

## 2023-08-04 VITALS
DIASTOLIC BLOOD PRESSURE: 82 MMHG | HEART RATE: 79 BPM | HEIGHT: 65 IN | BODY MASS INDEX: 29.66 KG/M2 | WEIGHT: 178 LBS | SYSTOLIC BLOOD PRESSURE: 121 MMHG

## 2023-08-04 DIAGNOSIS — F32.81 PMDD (PREMENSTRUAL DYSPHORIC DISORDER): Primary | ICD-10-CM

## 2023-08-04 RX ORDER — AMOXICILLIN AND CLAVULANATE POTASSIUM 875; 125 MG/1; MG/1
1 TABLET, FILM COATED ORAL EVERY 12 HOURS SCHEDULED
COMMUNITY
Start: 2023-07-31

## 2023-08-04 RX ORDER — FLUOXETINE HYDROCHLORIDE 20 MG/1
20 CAPSULE ORAL DAILY
Qty: 30 CAPSULE | Refills: 12 | Status: SHIPPED | OUTPATIENT
Start: 2023-08-04

## 2023-08-04 RX ORDER — METHYLPREDNISOLONE 4 MG/1
TABLET ORAL
COMMUNITY
Start: 2023-07-31

## 2023-10-26 ENCOUNTER — TELEPHONE (OUTPATIENT)
Dept: OBSTETRICS AND GYNECOLOGY | Facility: CLINIC | Age: 38
End: 2023-10-26

## 2023-10-26 NOTE — TELEPHONE ENCOUNTER
Provider: Asuncion Murphy DO    Caller: RED SAEZ    Relationship to Patient: SELF    Pharmacy:     Phone Number: [phone]     Reason for Call: BREAST DISCHARGE AND PAIN    When was the patient last seen: 08.04.23    When did it start: PAIN A FEW DAYS AGO, DISCHARGE STARTED 10.26.23    Where is it located: LEFT BREAST    Characteristics of symptom/severity: TENDER BEHIND THE NIPPLE, BROWN DISCHARGE    Timing- Is it constant or intermittent: TENDER TO THE TOUCH    What makes it worse: IF PATIENT TOUCHES IT OR LAYS ON HER LEFT BREAST    What makes it better: HAVEN'T TRIED     I DID TRY TO CALL THE CLINICAL SIDE BEFORE SENDING MESSAGE.    PATIENT CAN BE REACHED [phone]     PATIENT SAID IT IS OKAY TO CONTACT HER THROUGH [facility]    THANK YOU

## 2023-10-27 NOTE — PROGRESS NOTES
GYN Visit    Chief Complaint   Patient presents with    Left Breast Discharge       HPI:   38 y.o. LMP: Patient's last menstrual period was 10/30/2023 (exact date).     Social History     Substance and Sexual Activity   Sexual Activity Yes    Partners: Male    Birth control/protection: Bilateral salpingectomy      Not Breast feeding, stopped last year.    Left breast last week brown from nipple, just one duct.  On right side milky dc only. Expression only, no spontaneous discharge.     Pain in left breast, next to nipple and underneath.  No pain in right breast.    Fhx breast CA- GM and mat great aunts.  M w benign breast tumors, not up to date w MMGs.  None tested for genetic cause.    History: PMHx, Meds, Allergies, PSHx, Social Hx, and POBHx all reviewed and updated.    PHYSICAL EXAM:  /68   Wt 84.8 kg (187 lb)   LMP 10/30/2023 (Exact Date)   Breastfeeding No   BMI 31.12 kg/m²   Facility age limit for growth %nick is 20 years.  General- NAD, alert and oriented, appropriate  Psych- Normal mood, good memory      Breast left-  Bilaterally symmetrical, no masses, non tender, no axillary or supraclavicular nodes palpable. Multiple ducts clear and milky drainage, one duct mid nipple thick brown all w expression only.   Breast right- Bilaterally symmetrical, no masses, non tender, no axillary or supraclavicular nodes palpable. One duct milky discharge with expression.    ASSESSMENT AND PLAN:  Diagnoses and all orders for this visit:    1. Galactorrhea not associated with childbirth (Primary)  -     TSH  -     T4, Free  -     Prolactin  -     Mammo Diagnostic Digital Tomosynthesis Bilateral With CAD; Future  -     US Breast Bilateral Limited; Future    2. Mastalgia  -     Mammo Diagnostic Digital Tomosynthesis Bilateral With CAD; Future  -     US Breast Bilateral Limited; Future    We discussed the fact that it is bilateral and milky in nature, this is consistent with a benign etiology.  Though brown  discharge potentially could be old blood, it is usually also benign.  We discussed suspicious colors include bloody discharge.  Also discussed the possibility of papilloma.      Follow Up:  Return if symptoms worsen or fail to improve.          Asuncion Murphy,   10/30/2023    [facility]  [address] [place] [address]  Dept: [phone]  Dept Fax: [phone]  Loc: [phone]  Loc Fax: [phone]

## 2023-10-30 ENCOUNTER — OFFICE VISIT (OUTPATIENT)
Dept: OBSTETRICS AND GYNECOLOGY | Facility: CLINIC | Age: 38
End: 2023-10-30
Payer: COMMERCIAL

## 2023-10-30 VITALS — WEIGHT: 187 LBS | BODY MASS INDEX: 31.12 KG/M2 | SYSTOLIC BLOOD PRESSURE: 118 MMHG | DIASTOLIC BLOOD PRESSURE: 68 MMHG

## 2023-10-30 DIAGNOSIS — N64.3 GALACTORRHEA NOT ASSOCIATED WITH CHILDBIRTH: Primary | ICD-10-CM

## 2023-10-30 DIAGNOSIS — N64.4 MASTALGIA: ICD-10-CM

## 2023-10-30 LAB
PROLACTIN SERPL-MCNC: 20.5 NG/ML (ref 4.79–23.3)
T4 FREE SERPL-MCNC: 1.23 NG/DL (ref 0.93–1.7)
TSH SERPL DL<=0.05 MIU/L-ACNC: 1.33 UIU/ML (ref 0.27–4.2)

## 2023-10-30 PROCEDURE — 84439 ASSAY OF FREE THYROXINE: CPT | Performed by: OBSTETRICS & GYNECOLOGY

## 2023-10-30 PROCEDURE — 84146 ASSAY OF PROLACTIN: CPT | Performed by: OBSTETRICS & GYNECOLOGY

## 2023-10-30 PROCEDURE — 84443 ASSAY THYROID STIM HORMONE: CPT | Performed by: OBSTETRICS & GYNECOLOGY

## 2023-10-30 NOTE — PATIENT INSTRUCTIONS
Venipuncture Blood Specimen Collection  Venipuncture performed in left arm by Laura Urbina with good hemostasis. Patient tolerated the procedure well without complications.   10/30/23   Laura Urbina

## 2023-10-31 PROBLEM — N64.3 GALACTORRHEA: Status: ACTIVE | Noted: 2023-10-31

## 2023-11-09 ENCOUNTER — HOSPITAL ENCOUNTER (OUTPATIENT)
Dept: ULTRASOUND IMAGING | Facility: HOSPITAL | Age: 38
Discharge: HOME | End: 2023-11-09
Payer: COMMERCIAL

## 2023-11-09 ENCOUNTER — HOSPITAL ENCOUNTER (OUTPATIENT)
Dept: MAMMOGRAPHY | Facility: HOSPITAL | Age: 38
Discharge: HOME | End: 2023-11-09
Admitting: OBSTETRICS & GYNECOLOGY
Payer: COMMERCIAL

## 2023-11-09 DIAGNOSIS — N64.3 GALACTORRHEA NOT ASSOCIATED WITH CHILDBIRTH: ICD-10-CM

## 2023-11-09 DIAGNOSIS — N64.4 MASTALGIA: ICD-10-CM

## 2023-11-09 PROCEDURE — G0279 TOMOSYNTHESIS, MAMMO: HCPCS

## 2023-11-09 PROCEDURE — 77066 DX MAMMO INCL CAD BI: CPT

## 2023-11-09 PROCEDURE — 76642 ULTRASOUND BREAST LIMITED: CPT

## 2024-08-28 DIAGNOSIS — F32.81 PMDD (PREMENSTRUAL DYSPHORIC DISORDER): ICD-10-CM

## 2024-08-28 NOTE — TELEPHONE ENCOUNTER
Pharmacy requesting refill on Prozac.  Last seen 10/30/23.  Last filled 8/4/23 Prozac # 30 with 12 refills.  No appointment scheduled

## 2024-11-22 DIAGNOSIS — F32.81 PMDD (PREMENSTRUAL DYSPHORIC DISORDER): ICD-10-CM

## (undated) DEVICE — ENDOPATH XCEL BLADELESS TROCARS WITH STABILITY SLEEVES: Brand: ENDOPATH XCEL

## (undated) DEVICE — Device

## (undated) DEVICE — TOTAL TRAY, 16FR 10ML SIL FOLEY, URN: Brand: MEDLINE

## (undated) DEVICE — INSUFFLATION NEEDLE TO ESTABLISH PNEUMOPERITONEUM.: Brand: INSUFFLATION NEEDLE

## (undated) DEVICE — NDL HYPO ECLPS SFTY 22G 1 1/2IN

## (undated) DEVICE — STERILE POLYISOPRENE POWDER-FREE SURGICAL GLOVES: Brand: PROTEXIS

## (undated) DEVICE — TBG INSUFFLATION W/CPC CONNEC: Brand: MEDLINE INDUSTRIES, INC.

## (undated) DEVICE — SLV SCD KN/LEN ADJ EXPRSS BLENDED MD 1P/U

## (undated) DEVICE — ADHS LIQ MASTISOL 2/3ML

## (undated) DEVICE — SYR LL TP 10ML STRL

## (undated) DEVICE — STERILE POLYISOPRENE POWDER-FREE SURGICAL GLOVES WITH EMOLLIENT COATING: Brand: PROTEXIS

## (undated) DEVICE — 3M™ STERI-STRIP™ REINFORCED ADHESIVE SKIN CLOSURES, R1547, 1/2 IN X 4 IN (12 MM X 100 MM), 6 STRIPS/ENVELOPE: Brand: 3M™ STERI-STRIP™

## (undated) DEVICE — INTENDED FOR TISSUE SEPARATION, AND OTHER PROCEDURES THAT REQUIRE A SHARP SURGICAL BLADE TO PUNCTURE OR CUT.: Brand: BARD-PARKER ® CARBON RIB-BACK BLADES

## (undated) DEVICE — MARYLAND JAW LAPAROSCOPIC SEALER/DIVIDER COATED: Brand: LIGASURE

## (undated) DEVICE — DUAL LUMEN STOMACH TUBE,ANTI-REFLUX VALVE: Brand: SALEM SUMP

## (undated) DEVICE — SKIN PREP TRAY W/CHG: Brand: MEDLINE INDUSTRIES, INC.

## (undated) DEVICE — KT ANTI FOG W/FLD AND SPNG

## (undated) DEVICE — ENDOPATH XCEL WITH OPTIVIEW TECHNOLOGY BLADELESS TROCARS WITH STABILITY SLEEVES: Brand: ENDOPATH XCEL OPTIVIEW

## (undated) DEVICE — GOWN,REINF,POLY,SIRUS,BRTH SLV,XLNG/XXL: Brand: MEDLINE

## (undated) DEVICE — SPNG GZ 2S 2X2 4PLY STRL PK/2

## (undated) DEVICE — PCH SURG INST LAP 2 LF

## (undated) DEVICE — COVADERM PLUS: Brand: DEROYAL

## (undated) DEVICE — SUT MNCRYL PLS ANTIB UD 4/0 PS2 18IN

## (undated) DEVICE — LITHOTOMY-YELLOW FINS: Brand: MEDLINE INDUSTRIES, INC.